# Patient Record
(demographics unavailable — no encounter records)

---

## 2022-07-19 PROBLEM — S50.10XA CONTUSION OF FOREARM: Status: ACTIVE | Noted: 2022-07-19

## 2022-07-27 LAB
ANION GAP SERPL CALC-SCNC: 11 MMOL/L (ref 2–17)
APPEARANCE UR: CLEAR
BASOPHILS # BLD AUTO: 0 X10E3/MCL (ref 0–0.2)
BASOPHILS NFR BLD AUTO: 0.1 % (ref 0–2)
BILIRUB UR STRIP.AUTO-MCNC: NEGATIVE MG/DL
BUN SERPL-MCNC: 7 MG/DL (ref 6–20)
CALCIUM SERPL-MCNC: 8.6 MG/DL (ref 8.6–10)
CHLORIDE SERPL-SCNC: 103 MMOL/L (ref 98–107)
COLOR UR: YELLOW
CREAT SERPL-MCNC: 0.4 MG/DL (ref 0.5–1)
DEPRECATED HCO3 PLAS-SCNC: 22 MMOL/L (ref 22–29)
EOSINOPHIL # BLD AUTO: 0 X10E3/MCL (ref 0–0.5)
EOSINOPHIL NFR BLD AUTO: 0.4 % (ref 0–7)
ERYTHROCYTE [DISTWIDTH] IN BLOOD BY AUTOMATED COUNT: 11.7 % (ref 11–16)
GFR SERPL CREATININE-BSD FRML MDRD: 141 ML/MIN/1.73M²
GLUCOSE SERPL-MCNC: 86 MG/DL (ref 70–99)
GLUCOSE UR STRIP.AUTO-MCNC: NEGATIVE MG/DL
HCT VFR BLD AUTO: 32 % (ref 34–47)
HGB BLD-MCNC: 10.7 G/DL (ref 11.5–15.7)
IMMATURE GRANS (ABS): 0.03 X10E3/MCL (ref 0–0.06)
IMMATURE GRANULOCYTES: 0.4 % (ref 0–0.6)
INFLUENZA A: NOT DETECTED
INFLUENZA B: NOT DETECTED
KETONES UR STRIP.AUTO-MCNC: 40 MG/DL
LEUKOCYTE ESTERASE UR QL STRIP: NEGATIVE
LYMPHOCYTES # SPEC AUTO: 0.2 X10E3/MCL (ref 1–3.2)
LYMPHOCYTES NFR BLD AUTO: 2.7 % (ref 15–45)
MCH RBC QN AUTO: 32.5 PG (ref 27–34.5)
MCHC RBC AUTO-ENTMCNC: 33.4 G/DL (ref 32–36)
MCV RBC AUTO: 97.3 FL (ref 81–99)
MONOCYTES # BLD AUTO: 0.5 X10E3/MCL (ref 0.3–1)
MONOCYTES NFR BLD AUTO: 7 % (ref 4–12)
NEUTROPHILS # BLD AUTO: 6.9 X10E3/MCL (ref 1.6–7.3)
NEUTROPHILS NFR BLD AUTO: 89.4 % (ref 42–74)
NITRITE UR QL STRIP.AUTO: NEGATIVE
OSMOLALITY SERPL CALC.SUM OF ELEC: 269 MOSM/KG (ref 270–287)
PH UR STRIP.AUTO: 7 [PH] (ref 4.5–8)
PLATELET # BLD AUTO: 131 X10E3/MCL (ref 140–440)
PMV BLD AUTO: 9.8 FL (ref 7.2–13.2)
POTASSIUM SERPL-SCNC: 3.9 MMOL/L (ref 3.5–5.3)
PROT UR QL STRIP: NEGATIVE
RBC # BLD AUTO: 3.29 X10E6/MCL (ref 3.6–5.2)
RBC # UR STRIP: NEGATIVE /UL
SARS-COV-2: DETECTED
SODIUM SERPL-SCNC: 136 MMOL/L (ref 135–145)
SP GR UR STRIP: 1.02 (ref 1–1.03)
UROBILINOGEN UR STRIP-MCNC: 2 EU/DL
WBC # BLD AUTO: 7.7 X10E3/MCL (ref 3.8–10.6)

## 2022-07-28 LAB — FINAL REPORT: NORMAL

## 2022-10-18 NOTE — DISCHARGE SUMMARY
ED Clinical Summary                         [facility]IDENT[facility]AC[facility]  [address], [address] [phone]           PERSON INFORMATION  Name: Anna Marcus Age:  22 Years : 1997   Sex: Female Language: English PCP: PCP,  NONE   Marital Status:  Single Phone: [phone] Med Service: MED-Medicine   MRN:  [id number] Acct# [de-identified] Arrival: 2022 11:00:00   Visit Reason: Headache; COVID SYMPTOMS Acuity: 2 LOS: 000 02:54   Address:      [address] [place] [id number]  Diagnosis:      Otitis externa; Pregnancy  Printed Prescriptions: Allergies      penicillin      amoxicillin (Unknown)      Medications Administered During Visit:                  Medication Dose Route   Sodium Chloride 0.9% 1000 mL IV Piggyback   acetaminophen 1000 mg Oral   diphenhydrAMINE 25 mg Oral       Patient Medication List:              neomycin/polymyxin B/hydrocortisone otic (neomycin/polymyxin B/hydrocortisone 0.35%-10,000 units/mL-1% otic solution) 2 Drops Otic (in the ear) 4 times a day for 7 Days. Refills: 0. Major Tests and Procedures: The following procedures and tests were performed during your ED visit.   COMMONPROCEDURES%>  COMMON PROCEDURESCOMMENTS%>          Laboratory Orders  Name Status Details   BMP Completed Blood, Stat, ST - Stat, 22 11:57:00 EDT, 22 11:58:00 EDT, Nurse collect, [place] Bertha M-DO, Print label Y/N   C Urine Ordered Urine, Clean Catch, Stat, ST - Stat, 22 11:58:00 EDT, 22 11:58:00 EDT, Nurse collect   CBCDIFF Completed Blood, Stat, ST - Stat, 22 11:57:00 EDT, 22 11:58:00 EDT, Nurse collect, [place] Bertha M-DO, Print label Y/N   COVFlu [facility] Completed Nasopharyngeal Swab, Stat, ST - Stat, 22 11:58:00 EDT, 22 11:58:00 EDT, Nurse collect, [place] Bertha M-DO, Print label Y/N   UA Rflx Miah Completed Urine, Clean Catch, Stat, ST - Stat, 22 11:58:00 EDT, 22 11:58:00 Visit          DISCHARGE INFORMATION   Discharge Disposition: H Outpt-Sent Home   Discharge Location:    Home   Discharge Date and Time:    2022 13:54:43   ED Checkout Date and Time:    2022 13:54:43     DEPART REASON INCOMPLETE INFORMATION               Depart Action Incomplete Reason   Interactive View/I&O Recently assessed               Problems      Active           No Chronic Problems              Smoking Status      10 or more cigarettes (1/2 pack or more)/day in last 30 days         PATIENT EDUCATION INFORMATION  Instructions:         COVID Discharge Instructions positive results (Custom); Otitis Externa; Ear Drops, Adult     Follow up:                    With: Address: When:   Follow up with primary care provider  Within 1 week   Comments:   Return to ED if symptoms worsen   Follow up with your ob gyn as well           ED PROVIDER DOCUMENTATION     Patient:   Sita Davison             MRN: [id number]            FIN: [id number]               Age:   22 years     Sex:  Female     :  1997   Associated Diagnoses:   Otitis externa; Pregnancy   Author:   Thuy PIPER      Basic Information   Time seen: Provider Seen (ST)   ED Provider/Time:    Thuy PIPER / 2022 11:37  . Additional information: Chief Complaint from Nursing Triage Note   Chief Complaint  Chief Complaint: pt c/o HA, runny nose, ear pain x1day. reports recent sick contacts. 21 weeks pregnant. denies blurred vision, paresthesia, unilateral weakness. ambulatory, speaking in full sentences. (22 11:08:00). History of Present Illness   Patient is a 15-year-old female presents emergency department with left ear pain that began today and some fevers chills body aches and sinus congestion that began 2 to 3 days ago, she did not check her temperature did not take anything because she is 21 weeks pregnant. She did not know what to take.   She denies abdominal pain vaginal bleeding discharge abdominal pain vomiting. She states she has a decreased appetite slightly. .        Review of Systems             Additional review of systems information: All other systems reviewed and otherwise negative. Health Status   Allergies: Allergic Reactions (All)  Mild  Amoxicillin- Unknown. Unknown  Penicillin- No reactions were documented. .      Past Medical/ Family/ Social History   Medical history: Reviewed as documented in chart. Surgical history: Reviewed as documented in chart. Family history: Not significant. Social history: Reviewed as documented in chart. Problem list:    Active Problems (1)  No Chronic Problems   . Physical Examination               Vital Signs   Vital Signs   3/62/0186 59:77 EDT Systolic Blood Pressure 96 mmHg    Diastolic Blood Pressure 64 mmHg    Peripheral Pulse Rate 107 bpm  HI    Heart Rate Monitored 107 bpm  HI    Respiratory Rate 17 br/min    SpO2 100 %   8/42/6298 40:62 EDT Systolic Blood Pressure 399 mmHg    Diastolic Blood Pressure 65 mmHg    Temperature Oral 37.2 degC    Heart Rate Monitored 126 bpm  HI    Respiratory Rate 20 br/min    SpO2 96 %   . Measurements   7/27/2022 11:10 EDT Body Mass Index est kasie 22.35 kg/m2    Body Mass Index Measured 22.35 kg/m2   7/27/2022 11:08 EDT Height/Length Measured 158 cm    Weight Dosing 55.8 kg   . Basic Oxygen Information   7/27/2022 11:44 EDT Oxygen Therapy Room air    SpO2 100 %   7/27/2022 11:08 EDT Oxygen Therapy Room air    SpO2 96 %   . General:  Alert, no acute distress. Skin:  Warm, dry. Head:  Normocephalic. Neck:  Supple, trachea midline, no JVD. Eye:  Pupils are equal, round and reactive to light, extraocular movements are intact. Ears, nose, mouth and throat:  Oral mucosa moist, Right TM normal, left TM obscured by otitis externa mild. Cardiovascular:  Regular rate and rhythm, No murmur, Normal peripheral perfusion. Respiratory:  Lungs are clear to auscultation, respirations are non-labored. syndrome along with otitis externa. Will obtain a COVID and flu given that she is pregnant and obtain some basic blood work we will check fetal heart tones. .      Reexamination/ Reevaluation   Time: 7/27/2022 13:21:00 . Notes: Resting comfortably in no distress, heart rate in the 90s blood pressure stable. I discussed results with the patient we will treat with a topical eardrops and follow-up with OB/GYN and primary doctor. doppler is 167. I discussed the results with the patient at the bedside, including all incidental findings, patient exhibited understanding. I discussed discharge plan and follow-up. I advised the patient to follow up with primary doctor in 1-2 days and return to the ER if there are any changes, worsening, any other concerns at anytime. The patient is aware and in agreement with the plan of treatment. All questions answered. .      Impression and Plan   Diagnosis   Otitis externa (CWR36-EE H60.90, Discharge, Medical)   Pregnancy (GPW35-RO Z34.90, Discharge, Medical)   Plan   Condition: Stable. Disposition: Discharged: to home. Prescriptions: Launch prescriptions   Pharmacy:  neomycin/polymyxin B/hydrocortisone 0.35%-10,000 units/mL-1% otic solution (Prescribe): 2 drops, Otic, QID, for 7 days, 10 mL, 0 Refill(s). Patient was given the following educational materials: Ear Drops, Adult, Otitis Externa,   COVID Discharge Instructions positive results (Custom). Follow up with: Follow up with primary care provider Within 1 week Return to ED if symptoms worsen  Follow up with your ob gyn as well. Counseled: I had a detailed discussion with the patient and/or guardian regarding the historical points/exam findings supporting the discharge diagnosis and need for outpatient followup. Discussed the need to return to the ER if symptoms persist/worsen, or for any questions/concerns that arise at home.

## 2022-10-18 NOTE — ED NOTES
ED Triage Note       ED Triage Adult Entered On:  7/27/2022 11:10 EDT    Performed On:  7/27/2022 11:08 EDT by Nawaf Luna RN, [facility]               Triage   Numeric Rating Pain Scale :   9   Chief Complaint :   pt c/o HA, runny nose, ear pain x1day. reports recent sick contacts. 21 weeks pregnant. denies blurred vision, paresthesia, unilateral weakness. ambulatory, speaking in full sentences.     [facility] Mode of Arrival :   Private vehicle   Infectious Disease Documentation :   Document assessment   Temperature Oral :   37.2 degC(Converted to: 99.0 degF)    Heart Rate Monitored :   126 bpm (HI)    Respiratory Rate :   20 br/min   Systolic Blood Pressure :   367 mmHg   Diastolic Blood Pressure :   65 mmHg   SpO2 :   96 %   Oxygen Therapy :   Room air   Patient presentation :   HR > 100   Chief Complaint or Presentation suggest infection :   No   Weight Dosing :   55.8 kg(Converted to: 123 lb 0 oz)    Height :   158 cm(Converted to: 5 ft 2 in)    Body Mass Index Dosing :   22 kg/m2   BERNICE JETER Jason Sprout - 7/27/2022 11:08 EDT   DCP GENERIC CODE   Tracking Acuity :   2   Tracking Group :   [employer], Matt QUEEN - 7/27/2022 11:08 EDT   ED General Section :   Document assessment   Pregnancy Status :   Confirmed Pregnancy   ED Allergies Section :   Document assessment   ED Reason for Visit Section :   Document assessment   Patient Stated LULU :   12/8/2022 EST   BERNICE JETER Evelynn Calin M - 7/27/2022 11:08 EDT   ID Risk Screen Symptoms   Recent Travel History :   No recent travel   TB Symptom Screen :   No symptoms   Last 90 days COVID-19 ID :   No   Close Contact with COVID-19 ID :   No   Last 14 days COVID-19 ID :   No   BERNICE JETER Evelynn Calin M - 7/27/2022 11:08 EDT   Allergies   (As Of: 7/27/2022 11:10:53 EDT)   Allergies (Active)   amoxicillin  Estimated Onset Date:   Unspecified ; Reactions:   Unknown ; Created By:   Harry Isabel; Reaction Status:   Active ; Category:   Drug ; Substance: amoxicillin ; Type: Allergy ; Severity:   Mild ; Updated By:   Racheal Rodas; Reviewed Date:   4/14/2022 23:25 EDT      penicillin  Estimated Onset Date:   Unspecified ; Created By:   Kaia Ramirez RN, Peace Arcos; Reaction Status:   Active ; Category:   Drug ; Substance:   penicillin ; Type: Allergy ; Severity:   Unknown ; Updated By:   Beth Mendes; Reviewed Date:   4/14/2022 23:25 EDT        Psycho-Social   Last 3 mo, thoughts killing self/others :   Patient denies   Right click within box for Suspected Abuse policy link. :   None   Feels Safe Where Live :   Yes   ED Behavioral Activity Rating Scale :   4 - Quiet and awake (normal level of activity)   BERNICE JETER, Linden Staples - 7/27/2022 11:08 EDT   ED Reason for Visit   (As Of: 7/27/2022 11:10:53 EDT)   Problems(Active)    No Chronic Problems (Cerner  :NKP )  Name of Problem:   No Chronic Problems ; Recorder:   Nancy VINES; Code:   NKP ; Last Updated:   12/3/2016 21:50 EST ;  Life Cycle Date:   12/3/2016 ; Life Cycle Status:   Active ; Vocabulary:   Cerner          Diagnoses(Active)    Headache  Date:   7/27/2022 ; Diagnosis Type:   Reason For Visit ; Confirmation:   Complaint of ; Clinical Dx:   Headache ; Classification:   Medical ; Clinical Service:   Emergency medicine ; Code:   PNED ; Probability:   0 ; Diagnosis Code:   [id number]

## 2022-10-18 NOTE — ED NOTES
ED Triage Note       ED Secondary Triage Entered On:  7/27/2022 11:43 EDT    Performed On:  7/27/2022 11:43 EDT by Danay RAM               General Information   Barriers to Learning :   None evident   COVID-19 Vaccine Status :   Not received   ED Home Meds Section :   Document assessment   [facility] ED Fall Risk Section :   Document assessment   ED Advance Directives Section :   Document assessment   ED Palliative Screen :   N/A (prefilled for <66yo)   Danay RAM - 7/27/2022 11:43 EDT   (As Of: 7/27/2022 11:43:58 EDT)   Problems(Active)    No Chronic Problems (Cerner  :NKP )  Name of Problem:   No Chronic Problems ; Recorder:   Devora VINES; Code:   NKP ; Last Updated:   12/3/2016 21:50 EST ;  Life Cycle Date:   12/3/2016 ; Life Cycle Status:   Active ; Vocabulary:   Cerner          Diagnoses(Active)    Headache  Date:   7/27/2022 ; Diagnosis Type:   Reason For Visit ; Confirmation:   Complaint of ; Clinical Dx:   Headache ; Classification:   Medical ; Clinical Service:   Emergency medicine ; Code:   PNED ; Probability:   0 ; Diagnosis Code:   [id number]             -    Procedure History   (As Of: 7/27/2022 11:43:58 EDT)     Anesthesia Minutes:   0 ; Procedure Name:   None ; Procedure Minutes:   0            [facility] Fall Risk Assessment Tool   Hx of falling last 3 months ED Fall :   No   Patient confused or disoriented ED Fall :   No   Patient intoxicated or sedated ED Fall :   No   Patient impaired gait ED Fall :   No   Use a mobility assistance device ED Fall :   No   Patient altered elimination ED Fall :   No   [facility] ED Fall Score :   0    Danay Abel [employer] MURRIETA - 7/27/2022 11:43 EDT   ED Advance Directive   Advance Directive :   No   Danay RAM - 7/27/2022 11:43 EDT   Med Hx   Medication List   (As Of: 7/27/2022 11:43:58 EDT)   No Known Home Medications     Danay RAM - 7/27/2022 11:43:45      Prescription/Discharge Order dicyclomine  :   dicyclomine ; Status:   Completed ; Ordered As Mnemonic:   Bentyl 20 mg oral tablet ; Simple Display Line:   20 mg, 1 tabs, Oral, QID, for 5 days, PRN: moderate pain (4-7), 20 tabs, 0 Refill(s) ; Ordering Provider:   Kilo Pennington; Catalog Code:   dicyclomine ; Order Dt/Tm:   3/8/2022 17:38:47 EST ; Comment:   \" THIS MEDICATION IS ASSOCIATED   WITH   AN INCREASED RISK OF FALLS. \"

## 2022-10-18 NOTE — ED PROVIDER NOTES
Ear Complaints *ED        Patient:   Jose Leach             MRN: [id number]            FIN: [id number]               Age:   22 years     Sex:  Female     :  1997   Associated Diagnoses:   Otitis externa; Pregnancy   Author:   Antoine PIPER      Basic Information   Time seen: Provider Seen (ST)   ED Provider/Time:    Antoine PIPER / 2022 11:37  . Additional information: Chief Complaint from Nursing Triage Note   Chief Complaint  Chief Complaint: pt c/o HA, runny nose, ear pain x1day. reports recent sick contacts. 21 weeks pregnant. denies blurred vision, paresthesia, unilateral weakness. ambulatory, speaking in full sentences. (22 11:08:00). History of Present Illness   Patient is a 20-year-old female presents emergency department with left ear pain that began today and some fevers chills body aches and sinus congestion that began 2 to 3 days ago, she did not check her temperature did not take anything because she is 21 weeks pregnant. She did not know what to take. She denies abdominal pain vaginal bleeding discharge abdominal pain vomiting. She states she has a decreased appetite slightly. .        Review of Systems             Additional review of systems information: All other systems reviewed and otherwise negative. Health Status   Allergies: Allergic Reactions (All)  Mild  Amoxicillin- Unknown. Unknown  Penicillin- No reactions were documented. .      Past Medical/ Family/ Social History   Medical history: Reviewed as documented in chart. Surgical history: Reviewed as documented in chart. Family history: Not significant. Social history: Reviewed as documented in chart. Problem list:    Active Problems (1)  No Chronic Problems   .       Physical Examination               Vital Signs   Vital Signs   3950 35:18 EDT Systolic Blood Pressure 96 mmHg    Diastolic Blood Pressure 64 mmHg    Peripheral Pulse Rate 107 bpm  HI    Heart Rate Monitored 107 bpm  HI    Respiratory Rate 17 br/min    SpO2 100 %   7/98/8493 62:58 EDT Systolic Blood Pressure 018 mmHg    Diastolic Blood Pressure 65 mmHg    Temperature Oral 37.2 degC    Heart Rate Monitored 126 bpm  HI    Respiratory Rate 20 br/min    SpO2 96 %   . Measurements   7/27/2022 11:10 EDT Body Mass Index est kasie 22.35 kg/m2    Body Mass Index Measured 22.35 kg/m2   7/27/2022 11:08 EDT Height/Length Measured 158 cm    Weight Dosing 55.8 kg   . Basic Oxygen Information   7/27/2022 11:44 EDT Oxygen Therapy Room air    SpO2 100 %   7/27/2022 11:08 EDT Oxygen Therapy Room air    SpO2 96 %   . General:  Alert, no acute distress. Skin:  Warm, dry. Head:  Normocephalic. Neck:  Supple, trachea midline, no JVD. Eye:  Pupils are equal, round and reactive to light, extraocular movements are intact. Ears, nose, mouth and throat:  Oral mucosa moist, Right TM normal, left TM obscured by otitis externa mild. Cardiovascular:  Regular rate and rhythm, No murmur, Normal peripheral perfusion. Respiratory:  Lungs are clear to auscultation, respirations are non-labored. Chest wall:  No deformity. Back:  Nontender, Normal range of motion. Musculoskeletal:  Normal ROM, normal strength. Gastrointestinal:  Soft, Normal bowel sounds, No pulsatile midline mass. , Gravid abdomen consistent with dates. Neurological:  Alert and oriented to person, place, time, and situation, No focal neurological deficit observed, normal speech observed, Gait is steady. .    Lymphatics   Psychiatric:  Cooperative, appropriate mood & affect.        Medical Decision Making   Results review:  Lab results : Lab View   7/27/2022 12:35 EDT Estimated Creatinine Clearance 171.64 mL/min   7/27/2022 12:14 EDT WBC 7.7 x10e3/mcL    RBC 3.29 x10e6/mcL  LOW    Hgb 10.7 g/dL  LOW    HCT 32.0 %  LOW    MCV 97.3 fL    MCH 32.5 pg    MCHC 33.4 g/dL    RDW 11.7 %    Platelet 386 H52Y2/ZER  LOW    MPV 9.8 fL    Neutro Auto 89.4 %  HI    Neutro Absolute 6.9 x10e3/mcL    Immature Grans Percent 0.4 %    Immature Grans Absolute 0.03 x10e3/mcL    Lymph Auto 2.7 %  LOW    Lymph Absolute 0.2 x10e3/mcL  LOW    Mono Auto 7.0 %    Mono Absolute 0.5 x10e3/mcL    Eosinophil Percent 0.4 %    Eos Absolute 0.0 x10e3/mcL    Basophil Auto 0.1 %    Baso Absolute 0.0 x10e3/mcL    Sodium Lvl 136 mmol/L    Potassium Lvl 3.9 mmol/L    Chloride 103 mmol/L    CO2 22 mmol/L    Glucose Random 86 mg/dL    BUN 7 mg/dL    Creatinine Lvl 0.4 mg/dL  LOW    AGAP 11 mmol/L    Osmolality Calc 269 mOsm/kg  LOW    Calcium Lvl 8.6 mg/dL    eGFR 141 mL/min/1.73mÂ²   7/27/2022 12:07 EDT UA Color Yellow    UA Appear Clear    UA Glucose Negative    UA Bili Negative    UA Ketones 40 mg/dL    UA Spec Grav 1.020    UA Blood Negative    UA pH 7.0    Protein U Negative    UA Urobilinogen 2.0 EU/dL    UA Nitrite Negative    UA Leuk Est Negative    COVID (SARS-CoV-2) (BRIGITTE) Detected    Influenza A (BRIGITTE) Not Detected    Influenza B (BRIGITTE) Not Detected   7/27/2022 11:10 EDT Estimated Creatinine Clearance 114.43 mL/min   . Notes:  Patient presenting with viral syndrome along with otitis externa. Will obtain a COVID and flu given that she is pregnant and obtain some basic blood work we will check fetal heart tones. .      Reexamination/ Reevaluation   Time: 7/27/2022 13:21:00 . Notes: Resting comfortably in no distress, heart rate in the 90s blood pressure stable. I discussed results with the patient we will treat with a topical eardrops and follow-up with OB/GYN and primary doctor. doppler is 167. I discussed the results with the patient at the bedside, including all incidental findings, patient exhibited understanding. I discussed discharge plan and follow-up. I advised the patient to follow up with primary doctor in 1-2 days and return to the ER if there are any changes, worsening, any other concerns at anytime. The patient is aware and in agreement with the plan of treatment.  All questions answered. .      Impression and Plan   Diagnosis   Otitis externa (TEW63-DP H60.90, Discharge, Medical)   Pregnancy (EDD26-LH Z34.90, Discharge, Medical)   Plan   Condition: Stable. Disposition: Discharged: to home. Prescriptions: Launch prescriptions   Pharmacy:  neomycin/polymyxin B/hydrocortisone 0.35%-10,000 units/mL-1% otic solution (Prescribe): 2 drops, Otic, QID, for 7 days, 10 mL, 0 Refill(s). Patient was given the following educational materials: Ear Drops, Adult, Otitis Externa,   COVID Discharge Instructions positive results (Custom). Follow up with: Follow up with primary care provider Within 1 week Return to ED if symptoms worsen  Follow up with your ob gyn as well. Counseled: I had a detailed discussion with the patient and/or guardian regarding the historical points/exam findings supporting the discharge diagnosis and need for outpatient followup. Discussed the need to return to the ER if symptoms persist/worsen, or for any questions/concerns that arise at home.     Signature Line     Electronically Signed on 07/27/2022 01:42 PM EDT   ________________________________________________   Delmar PIPER               Modified by: Delmar PIPER on 07/27/2022 01:32 PM EDT      Modified by: Delmar PIPER on 07/27/2022 01:36 PM EDT      Modified by: Delmar PIPER on 07/27/2022 01:42 PM EDT

## 2022-10-18 NOTE — ED NOTES
ED Patient Summary       ;          [facility]  [address], [place], [address] [place] [address]  [phone]  Discharge Instructions (Patient)  Rigo Urbina  :  1997                   MRN: [id number]                   FIN: [id number]  Reason For Visit: Headache; COVID SYMPTOMS  Final Diagnosis: Otitis externa; Pregnancy     Visit Date: 2022 11:00:00  Address: [place] [address]  Phone: [phone]     Emergency Department Providers:         Primary Physician:      Alyson Christopher would like to thank you for allowing us to assist you with your healthcare needs. The following includes patient education materials and information regarding your injury/illness. Follow-up Instructions: You were seen today on an emergency basis. Please contact your primary care doctor for a follow up appointment. If you received a referral to a specialist doctor, it is important you follow-up as instructed. It is important that you call your follow-up doctor to schedule and confirm the location of your next appointment. Your doctor may practice at multiple locations. The office location of your follow-up appointment may be different to the one written on your discharge instructions. If you do not have a primary care doctor, please call [phone] for help in finding a Shae Gonzalez. [facility] Provider. For help in finding a specialist doctor, please call ..[phone]. If your condition gets worse before your follow-up with your primary care doctor or specialist, please return to the Emergency Department. Coronavirus 2019 (COVID-19) Reminders:     Patients age 15 - 24, with parental consent, and over age 25 can make an appointment for a COVID-19 vaccine. Patients can contact their [facility] doctors' offices to schedule an appointment to receive the COVID-19 vaccine.  Patients who do not have a [facility] physician can call [phone] to schedule vaccination appointments. Follow Up Appointments:  Primary Care Provider:     Name: PCP,  NONE     Phone:                  With: Address: When:   Follow up with primary care provider  Within 1 week   Comments:   Return to ED if symptoms worsen   Follow up with your ob gyn as well              [address] SERVICES%>             New Medications  Printed Prescriptions  neomycin/polymyxin B/hydrocortisone otic (neomycin/polymyxin B/hydrocortisone 0.35%-10,000 units/mL-1% otic solution) 2 Drops Otic (in the ear) 4 times a day for 7 Days. Refills: 0. Last Dose:____________________      Allergy Info: penicillin; amoxicillin     Discharge Additional Information          Discharge Patient 07/27/22 13:36:00 EDT      Patient Education Materials:                     COVID-19 Testing, Precautions, & Infection     x Your COVID-19 lab test result is POSITIVE. Your COVID-19 lab result is positive (detected) for the COVID-19 infection. Please follow all other discharge instructions given to you by your healthcare provider. Please follow the quarantine/isolation instructions on page 2.          **** The quickest way to view or print your own test result is by the Patient Portal at [email or web address] see below for further Patient Portal details. Note: In reviewing your COVID-19 result on the patient portal (under the results tab) you may notice a variation in the test name that includes, Coronavirus, cNoV, Corona, SARS, or COV2. These all relate to a COVID-19 test that you had performed at a [address] location.          CDC Quarantine/Isolation Guidelines  q If you were exposed to COVID-19 and are NOT UP-TO-DATE on COVID-19 vaccinations  · Stay home and quarantine until you receive your test result  If negative, you may return to normal activities  If your test result is positive, follow the instructions below  q If you were exposed to COVID-19 and are UP-TO-DATE on COVID-19 vaccinations  · You do not need to stay home unless you develop symptoms  · If you have symptoms, isolate at home until you receive your test result  If negative, you may return to normal activities  If your test result is positive, follow the instructions below  q If you were exposed to COVID-19 and had confirmed COVID-19 within the   past 90 days  · You do not need to stay home unless you develop symptoms  · If you have symptoms, isolate at home until you receive your test result  If negative, you may return to normal activities  If your test result is positive, follow the instructions below    q If your COVID-19 test is POSITIVE  · Stay home and isolate from others for at least 5 days  · You may end your isolation after 5 days if your symptoms are improving and you have gone at least 24 hours without a fever (without taking Tylenol, ibuprofen, or any other medications to reduce your fever)continue your isolation for a total of 10 days if your symptoms are not improving or you still have a fever after 5 days  · You should continue to wear a mask for 10 full days any time you are around others (inside or outside of your home)  · Avoid sharing confined spaces with others as much as possible. If you live in your home with other people, consider keeping a separate bedroom and bathroom just for your use    How to Access the [facility] Patient Portal  1. Go to: [email or web address]  2. You want the option of:  [facility]   If you do not have an account and are visiting the portal for the first time, select Sign up now. If you already have an account, select Log into [facility].        3. If you are signing up for a new account you will fill out a Self-Enrollment for [facility] page in which you will securely enter your first, last name, date of birth, social security number, and an identity verification prompt. *Tip:  when filling out the Self-Enrollment for [facility] enter your name using the same spelling that is on file with your physicians office or hospital. Once this page is filled out, hit the purple Next button at the bottom of the page. 4. Verify your information and click the purple Next Create Your Account button. 5. After identity verification, you will create your username and password for your [facility] Patient Portal. You will then click the green Create Account button. 6. Once your account has been created, you will be taken back to a login screen. Log in with the username and password that you created in step 5. If creating an account for a minor child, contact Medical Records at [phone] to receive a personal email invitation to enroll your child. Medical records is open M-F 8am-4:30pm. If you contact them after hours, select option 3 to leave a message and you will receive a call back the next business day. ! If you are having issues logging into or accessing your account, contact [employer] at [phone] available 7 days a week, 24 hours a day. CDC COVID-19 Resources  For more information, visit  Stockpulse.uk                    (Scan these codes with an Apple or Android device)                       CDC Quarantine/Isolation                       What to do when                           COVID-19 Quarantine           Guidelines                                        when you are sick                                  VS Isolation                                                                                                                                                                                                                                                                                                                        What is COVID-19? COVID-19 stands for \"coronavirus disease 2019. \" It is caused by a virus called SARS-CoV-2. The virus first appeared in late 2019 and quickly spread around the world. People with COVID-19 can have fever, cough, trouble breathing, and other symptoms. Problems with breathing happen when the infection affects the lungs and causes pneumonia. Most people who get COVID-19 will not get severely ill. But some do. In many areas, people have been told to stay home and away from other people. This is to try to slow the spread of the virus. How is COVID-19 Spread? The virus that causes COVID-19 mainly spreads from person to person. This usually happens when an infected person coughs, sneezes, or talks near other people. The virus can be passed easily between people who live together. But it can also spread at gatherings where people are talking close together, shaking hands, hugging, sharing food, or even singing together. Doctors also think it is possible to get sick if you touch a surface that has the virus on it and then touch your mouth, nose, or eyes. A person can be infected, and spread the virus to others, even without having any symptoms. This is why keeping people apart is one of the best ways to slow the spread. It is also possible for the virus to spread from an infected person to an animal, like a pet. But this seems to be uncommon. There is no evidence that a person could get the virus from a pet. Experts do not think the virus is spread through food like some other viruses. There is also no evidence that it can be spread through the water in a pool or hot tub. But because the virus can spread when people are close together, things like swimming in a crowded pool are still risky. What are the Symptoms of COVID-19? Symptoms usually start 4 or 5 days after a person is infected with the virus. But in some people, it can take up to 2 weeks for symptoms to appear. Some people never show symptoms at all.   When symptoms do happen, they can include:  · Fever    Shaking chills  Problems with sense of smell   · Cough               Muscle aches           or taste  · Trouble Breathing  Headache  · Feeling tired    Sore throat  Some people have digestive problems like nausea or diarrhea. There have also been some reports of rashes or other skin symptoms. For example, some people with COVID-19 get reddish-purple spots on their fingers or toes. But it's not clear why or how often this happens. For most people, symptoms will get better within a few weeks. But in others, COVID-19 can lead to serious problems like pneumonia, not getting enough oxygen, heart problems, or even death. This risk gets higher as people get older. It is also higher in people who have other health problems like serious heart disease, chronic kidney disease, type 2 diabetes, chronic obstructive pulmonary disease (COPD), sickle cell disease, or obesity. People who have a weak immune system for other reasons (for example, HIV infection or certain medicines), asthma, cystic fibrosis, type 1 diabetes, or high blood pressure might also be at higher risk for serious problems. What should I do if I have symptoms? If you have a fever, cough, trouble breathing, or other symptoms of COVID-19, call your doctor or nurse. They will ask about your symptoms. They might also ask about any recent travel and whether you have been around anyone who might be sick. If your symptoms are not severe, it is best to call before you go in. The staff can tell you what to do and whether you need to be seen in person. Many people with only mild symptoms should stay home and avoid other people until they get better. If you do need to go to the clinic or hospital, cover your nose and mouth with cloth. This helps protect other people. The staff might also have you wait someplace away from other people.   If you are severely ill and need to go to the clinic or hospital right away, you should still call ahead if possible. This way the staff can care for you while taking steps to protect others. If you think you are having a medical emergency, dial [id number]. Otitis Externa      Otitis externa is an infection of the outer ear canal. The outer ear canal is the area between the outside of the ear and the eardrum. Otitis externa is sometimes called swimmer's ear. What are the causes? Common causes of this condition include:   Swimming in dirty water. Moisture in the ear. An injury to the inside of the ear. An object stuck in the ear. A cut or scrape on the outside of the ear. What increases the risk? You are more likely to develop this condition if you go swimming often. What are the signs or symptoms? The first symptom of this condition is often itching in the ear. Later symptoms of the condition include:   Swelling of the ear. Redness in the ear. Ear pain. The pain may get worse when you pull on your ear. Pus coming from the ear. How is this diagnosed? This condition may be diagnosed by examining the ear and testing fluid from the ear for bacteria and funguses. How is this treated? This condition may be treated with:   Antibiotic ear drops. These are often given for 10?14 days. Medicines to reduce itching and swelling. Follow these instructions at home:     If you were prescribed antibiotic ear drops, use them as told by your health care provider. Do not stop using the antibiotic even if your condition improves. Take over-the-counter and prescription medicines only as told by your health care provider. Avoid getting water in your ears as told by your health care provider. This may include avoiding swimming or water sports for a few days. Keep all follow-up visits as told by your health care provider. This is important. How is this prevented? Keep your ears dry.  Use the corner of a towel to dry your ears after you swim or bathe. Avoid scratching or putting things in your ear. Doing these things can damage the ear canal or remove the protective wax that lines it, which makes it easier for bacteria and funguses to grow. Avoid swimming in lakes, polluted water, or pools that may not have enough chlorine. Contact a health care provider if:     You have a fever. Your ear is still red, swollen, painful, or draining pus after 3 days. Your redness, swelling, or pain gets worse. You have a severe headache. You have redness, swelling, pain, or tenderness in the area behind your ear. Summary     Otitis externa is an infection of the outer ear canal.     Common causes include swimming in dirty water, moisture in the ear, or a cut or scrape in the ear. Symptoms include pain, redness, and swelling of the ear. If you were prescribed antibiotic ear drops, use them as told by your health care provider. Do not stop using the antibiotic even if your condition improves. This information is not intended to replace advice given to you by your health care provider. Make sure you discuss any questions you have with your health care provider. Document Revised: 05/24/2019 Document Reviewed: 05/24/2019  Elsevier Patient Education ? [address] Drops, Adult    You have been diagnosed with a condition that requires you to put drops of medicine into one ear or both ears. This sheet gives you information about how to use ear drops. Your health care provider may also give you more specific instructions. Supplies needed:     Cotton balls. Ear drops. How to put ear drops in your ear      1. Wash your hands thoroughly for 20 seconds with soap and water. If soap and water are not available, use hand . 2. Make sure your ears are clean and dry. If there is any earwax or drainage at the outer part of the ear canal, wipe it out gently with a cotton-tipped swab.     3. Warm up the medicine by holding it in your hand for a few minutes. 4. Gently shake the bottle to mix the ear drops. 5. Use the dropper to draw up the ear drops. 6. Hold the dropper above your ear canal and put the drops in the affected ear as instructed. Do not put the dropper into your ear at any time. It may help to pull the upper part of the ear up and back while you put the drops in. Doing this will straighten out the ear canal so the medicine can get into the canal more easily. 7. To make sure your ear soaks up the ear drops, do either of these things:   Lie down with the affected ear facing up for 10 minutes. This will cause the drops to stay in the ear canal and fill the canal.     Gently put a cotton ball in your ear canal. Leave enough of the cotton ball out so it can be easily removed. Do not push the cotton ball down into your ear with a cotton-tipped swab or other instrument. You can remove the cotton ball once the medicine has been absorbed by your ear, or after 15?30 minutes have passed. 8. If both ears need the drops, repeat the procedure for the other ear. Your health care provider will let you know if you need to put drops in both ears. 9. Wash your hands with soap and water for 20 seconds after using ear drops. If soap and water are not available, use hand . Follow these instructions at home:       Use the ear drops for as long as directed by your health care provider, even if you begin to feel better. Always wash your hands for 20 seconds before and after handling the ear drops. Keep the ear drops at room temperature. Do not wash out (irrigate) your ears unless instructed by your health care provider. Keep all follow-up visits as told by your health care provider. This is important. Contact a health care provider if:     Your condition gets worse. Your pain or itching gets worse.      You notice any unusual drainage from your ear, especially if the drainage has a bad smell. You have new trouble hearing. You develop a rash around your ear. You have used the ear drops for the amount of time recommended by your health care provider, but your symptoms have not improved. Get help right away if:     You experience a form of dizziness in which you feel as if the room is spinning and you feel like you might vomit (vertigo). The outside of your ear becomes red or swollen. You develop a severe headache with or without neck stiffness. Summary     Ear drops are a medicine that is placed in the ear. Put drops in the affected ear as told by your health care provider. Use the ear drops for as long as directed by your health care provider, even if you begin to feel better. Keep all follow-up visits as told by your health care provider. This is important. This information is not intended to replace advice given to you by your health care provider. Make sure you discuss any questions you have with your health care provider. Document Revised: 02/10/2021 Document Reviewed: 10/14/2020  [employer] Patient Education ? [address] [facility]      ---------------------------------------------------------------------------------------------------------------------  [facility] allows patients to review your COVID and other test results as well as discharge documents from any [facility], Emergency Department, surgical center or outpatient lab. Test results are typically available 36 hours after the test is completed. [address] encourages you to self-enroll in the [facility] Patient Portal.     To begin your self-enrollment process, please visit [email or web address] Under [facility], click on Sign up now. NOTE: You must be 16 years and older to use [facility] Self-Enroll online.  If you are a parent, caregiver, or guardian; you need an invite to access your

## 2022-10-18 NOTE — ED NOTES
ED Patient Education Note     ;Patient Education Materials Follows:                  COVID-19 Testing, Precautions, & Infection     x Your COVID-19 lab test result is POSITIVE. Your COVID-19 lab result is positive (detected) for the COVID-19 infection. Please follow all other discharge instructions given to you by your healthcare provider. Please follow the quarantine/isolation instructions on page 2.          **** The quickest way to view or print your own test result is by the Patient Portal at [email or web address] see below for further Patient Portal details. Note: In reviewing your COVID-19 result on the patient portal (under the results tab) you may notice a variation in the test name that includes, Coronavirus, cNoV, Corona, SARS, or COV2. These all relate to a COVID-19 test that you had performed at a [address] location.          CDC Quarantine/Isolation Guidelines  q If you were exposed to COVID-19 and are NOT UP-TO-DATE on COVID-19 vaccinations  · Stay home and quarantine until you receive your test result  If negative, you may return to normal activities  If your test result is positive, follow the instructions below  q If you were exposed to COVID-19 and are UP-TO-DATE on COVID-19 vaccinations  · You do not need to stay home unless you develop symptoms  · If you have symptoms, isolate at home until you receive your test result  If negative, you may return to normal activities  If your test result is positive, follow the instructions below  q If you were exposed to COVID-19 and had confirmed COVID-19 within the   past 90 days  · You do not need to stay home unless you develop symptoms  · If you have symptoms, isolate at home until you receive your test result  If negative, you may return to normal activities  If your test result is positive, follow the instructions below    q If your COVID-19 test is POSITIVE  · Stay home and isolate from others for at least 5 days  · You may end your isolation after 5 days if your symptoms are improving and you have gone at least 24 hours without a fever (without taking Tylenol, ibuprofen, or any other medications to reduce your fever)continue your isolation for a total of 10 days if your symptoms are not improving or you still have a fever after 5 days  · You should continue to wear a mask for 10 full days any time you are around others (inside or outside of your home)  · Avoid sharing confined spaces with others as much as possible. If you live in your home with other people, consider keeping a separate bedroom and bathroom just for your use    How to Access the [facility] Patient Portal  1. Go to: [email or web address]  2. You want the option of:  [facility]   If you do not have an account and are visiting the portal for the first time, select Sign up now. If you already have an account, select Log into [facility]. 3. If you are signing up for a new account you will fill out a Self-Enrollment for [facility] page in which you will securely enter your first, last name, date of birth, social security number, and an identity verification prompt. *Tip:  when filling out the Self-Enrollment for [facility] enter your name using the same spelling that is on file with your physicians office or hospital. Once this page is filled out, hit the purple Next button at the bottom of the page. 4. Verify your information and click the purple Next Create Your Account button. 5. After identity verification, you will create your username and password for your [facility] Patient Portal. You will then click the green Create Account button. 6. Once your account has been created, you will be taken back to a login screen. Log in with the username and password that you created in step 5.    If creating an account for a minor child, contact Medical Records at [phone] to receive a where people are talking close together, shaking hands, hugging, sharing food, or even singing together. Doctors also think it is possible to get sick if you touch a surface that has the virus on it and then touch your mouth, nose, or eyes. A person can be infected, and spread the virus to others, even without having any symptoms. This is why keeping people apart is one of the best ways to slow the spread. It is also possible for the virus to spread from an infected person to an animal, like a pet. But this seems to be uncommon. There is no evidence that a person could get the virus from a pet. Experts do not think the virus is spread through food like some other viruses. There is also no evidence that it can be spread through the water in a pool or hot tub. But because the virus can spread when people are close together, things like swimming in a crowded pool are still risky. What are the Symptoms of COVID-19? Symptoms usually start 4 or 5 days after a person is infected with the virus. But in some people, it can take up to 2 weeks for symptoms to appear. Some people never show symptoms at all. When symptoms do happen, they can include:  · Fever    Shaking chills  Problems with sense of smell   · Cough               Muscle aches           or taste  · Trouble Breathing  Headache  · Feeling tired    Sore throat  Some people have digestive problems like nausea or diarrhea. There have also been some reports of rashes or other skin symptoms. For example, some people with COVID-19 get reddish-purple spots on their fingers or toes. But it's not clear why or how often this happens. For most people, symptoms will get better within a few weeks. But in others, COVID-19 can lead to serious problems like pneumonia, not getting enough oxygen, heart problems, or even death. This risk gets higher as people get older.  It is also higher in people who have other health problems like serious heart disease, chronic kidney disease, type 2 diabetes, chronic obstructive pulmonary disease (COPD), sickle cell disease, or obesity. People who have a weak immune system for other reasons (for example, HIV infection or certain medicines), asthma, cystic fibrosis, type 1 diabetes, or high blood pressure might also be at higher risk for serious problems. What should I do if I have symptoms? If you have a fever, cough, trouble breathing, or other symptoms of COVID-19, call your doctor or nurse. They will ask about your symptoms. They might also ask about any recent travel and whether you have been around anyone who might be sick. If your symptoms are not severe, it is best to call before you go in. The staff can tell you what to do and whether you need to be seen in person. Many people with only mild symptoms should stay home and avoid other people until they get better. If you do need to go to the clinic or hospital, cover your nose and mouth with cloth. This helps protect other people. The staff might also have you wait someplace away from other people. If you are severely ill and need to go to the clinic or hospital right away, you should still call ahead if possible. This way the staff can care for you while taking steps to protect others. If you think you are having a medical emergency, dial [id number]. Caregiving     Ear Drops, Adult    You have been diagnosed with a condition that requires you to put drops of medicine into one ear or both ears. This sheet gives you information about how to use ear drops. Your health care provider may also give you more specific instructions. Supplies needed:     Cotton balls. Ear drops. How to put ear drops in your ear      1. Wash your hands thoroughly for 20 seconds with soap and water. If soap and water are not available, use hand . 2. Make sure your ears are clean and dry.  If there is any earwax or drainage at the outer part of the ear canal, wipe it out gently with a cotton-tipped swab. 3. Warm up the medicine by holding it in your hand for a few minutes. 4. Gently shake the bottle to mix the ear drops. 5. Use the dropper to draw up the ear drops. 6. Hold the dropper above your ear canal and put the drops in the affected ear as instructed. Do not put the dropper into your ear at any time. It may help to pull the upper part of the ear up and back while you put the drops in. Doing this will straighten out the ear canal so the medicine can get into the canal more easily. 7. To make sure your ear soaks up the ear drops, do either of these things:   Lie down with the affected ear facing up for 10 minutes. This will cause the drops to stay in the ear canal and fill the canal.     Gently put a cotton ball in your ear canal. Leave enough of the cotton ball out so it can be easily removed. Do not push the cotton ball down into your ear with a cotton-tipped swab or other instrument. You can remove the cotton ball once the medicine has been absorbed by your ear, or after 15?30 minutes have passed. 8. If both ears need the drops, repeat the procedure for the other ear. Your health care provider will let you know if you need to put drops in both ears. 9. Wash your hands with soap and water for 20 seconds after using ear drops. If soap and water are not available, use hand . Follow these instructions at home:       Use the ear drops for as long as directed by your health care provider, even if you begin to feel better. Always wash your hands for 20 seconds before and after handling the ear drops. Keep the ear drops at room temperature. Do not wash out (irrigate) your ears unless instructed by your health care provider. Keep all follow-up visits as told by your health care provider. This is important. Contact a health care provider if:     Your condition gets worse. Your pain or itching gets worse.      You notice any unusual ear.     Ear pain. The pain may get worse when you pull on your ear. Pus coming from the ear. How is this diagnosed? This condition may be diagnosed by examining the ear and testing fluid from the ear for bacteria and funguses. How is this treated? This condition may be treated with:   Antibiotic ear drops. These are often given for 10?14 days. Medicines to reduce itching and swelling. Follow these instructions at home:     If you were prescribed antibiotic ear drops, use them as told by your health care provider. Do not stop using the antibiotic even if your condition improves. Take over-the-counter and prescription medicines only as told by your health care provider. Avoid getting water in your ears as told by your health care provider. This may include avoiding swimming or water sports for a few days. Keep all follow-up visits as told by your health care provider. This is important. How is this prevented? Keep your ears dry. Use the corner of a towel to dry your ears after you swim or bathe. Avoid scratching or putting things in your ear. Doing these things can damage the ear canal or remove the protective wax that lines it, which makes it easier for bacteria and funguses to grow. Avoid swimming in lakes, polluted water, or pools that may not have enough chlorine. Contact a health care provider if:     You have a fever. Your ear is still red, swollen, painful, or draining pus after 3 days. Your redness, swelling, or pain gets worse. You have a severe headache. You have redness, swelling, pain, or tenderness in the area behind your ear. Summary     Otitis externa is an infection of the outer ear canal.     Common causes include swimming in dirty water, moisture in the ear, or a cut or scrape in the ear. Symptoms include pain, redness, and swelling of the ear.      If you were prescribed antibiotic ear drops, use them as told by your health care provider. Do not stop using the antibiotic even if your condition improves. This information is not intended to replace advice given to you by your health care provider. Make sure you discuss any questions you have with your health care provider. Document Revised: 05/24/2019 Document Reviewed: 05/24/2019  [facility] Patient Education ?  [id number] [address] [place].